# Patient Record
Sex: MALE | Race: WHITE | Employment: OTHER | URBAN - METROPOLITAN AREA
[De-identification: names, ages, dates, MRNs, and addresses within clinical notes are randomized per-mention and may not be internally consistent; named-entity substitution may affect disease eponyms.]

---

## 2019-03-28 ENCOUNTER — CONSULT (OUTPATIENT)
Dept: VASCULAR SURGERY | Facility: CLINIC | Age: 84
End: 2019-03-28
Payer: COMMERCIAL

## 2019-03-28 VITALS
DIASTOLIC BLOOD PRESSURE: 60 MMHG | HEART RATE: 70 BPM | BODY MASS INDEX: 26.14 KG/M2 | HEIGHT: 72 IN | SYSTOLIC BLOOD PRESSURE: 90 MMHG | TEMPERATURE: 98 F | WEIGHT: 193 LBS

## 2019-03-28 DIAGNOSIS — M79.89 SWELLING OF LOWER EXTREMITY: Primary | ICD-10-CM

## 2019-03-28 DIAGNOSIS — Z86.718 HISTORY OF DVT OF LOWER EXTREMITY: ICD-10-CM

## 2019-03-28 DIAGNOSIS — I87.2 CHRONIC VENOUS INSUFFICIENCY: ICD-10-CM

## 2019-03-28 DIAGNOSIS — R09.89 DECREASED PEDAL PULSES: ICD-10-CM

## 2019-03-28 PROBLEM — M19.90 OSTEOARTHRITIS: Status: ACTIVE | Noted: 2019-03-28

## 2019-03-28 PROBLEM — I48.91 A-FIB (HCC): Status: ACTIVE | Noted: 2019-03-28

## 2019-03-28 PROBLEM — I10 HTN (HYPERTENSION): Status: ACTIVE | Noted: 2019-03-28

## 2019-03-28 PROBLEM — E78.5 HLD (HYPERLIPIDEMIA): Status: ACTIVE | Noted: 2019-03-28

## 2019-03-28 PROCEDURE — 99203 OFFICE O/P NEW LOW 30 MIN: CPT | Performed by: NURSE PRACTITIONER

## 2019-03-28 RX ORDER — DILTIAZEM HYDROCHLORIDE 120 MG/1
TABLET, FILM COATED ORAL
COMMUNITY
End: 2019-03-28 | Stop reason: ALTCHOICE

## 2019-03-28 RX ORDER — DABIGATRAN ETEXILATE 150 MG/1
CAPSULE, COATED PELLETS ORAL
COMMUNITY
Start: 2019-01-03

## 2019-03-28 RX ORDER — ESZOPICLONE 2 MG/1
TABLET, FILM COATED ORAL DAILY
COMMUNITY
End: 2019-03-28 | Stop reason: ALTCHOICE

## 2019-03-28 RX ORDER — FUROSEMIDE 20 MG/1
40 TABLET ORAL 2 TIMES DAILY
Refills: 3 | COMMUNITY
Start: 2019-03-22

## 2019-03-28 RX ORDER — PENTOXIFYLLINE 400 MG/1
TABLET, EXTENDED RELEASE ORAL
COMMUNITY
End: 2019-03-28 | Stop reason: ALTCHOICE

## 2019-03-28 RX ORDER — OXYCODONE HYDROCHLORIDE AND ACETAMINOPHEN 5; 325 MG/1; MG/1
TABLET ORAL
COMMUNITY
End: 2019-03-28 | Stop reason: ALTCHOICE

## 2019-03-28 RX ORDER — DOFETILIDE 0.25 MG/1
CAPSULE ORAL
COMMUNITY
Start: 2019-03-27

## 2019-03-28 RX ORDER — DILTIAZEM HYDROCHLORIDE 180 MG/1
CAPSULE, EXTENDED RELEASE ORAL
COMMUNITY
End: 2019-03-28 | Stop reason: ALTCHOICE

## 2019-03-28 RX ORDER — DIPHENOXYLATE HYDROCHLORIDE AND ATROPINE SULFATE 2.5; .025 MG/1; MG/1
1 TABLET ORAL DAILY
COMMUNITY

## 2019-03-28 RX ORDER — WARFARIN SODIUM 5 MG/1
TABLET ORAL
COMMUNITY
End: 2019-03-28 | Stop reason: ALTCHOICE

## 2019-03-28 RX ORDER — LOSARTAN POTASSIUM 25 MG/1
TABLET ORAL
COMMUNITY
Start: 2019-03-22

## 2019-03-28 RX ORDER — DIGOXIN 125 MCG
125 TABLET ORAL EVERY OTHER DAY
Refills: 3 | COMMUNITY
Start: 2019-03-15

## 2019-03-28 RX ORDER — POTASSIUM CHLORIDE 20 MEQ/1
TABLET, EXTENDED RELEASE ORAL
COMMUNITY
Start: 2018-05-29

## 2019-03-28 RX ORDER — CEPHALEXIN 500 MG/1
500 CAPSULE ORAL
COMMUNITY
Start: 2019-03-19 | End: 2019-03-29

## 2019-03-28 RX ORDER — MECLIZINE HCL 12.5 MG/1
TABLET ORAL
Refills: 2 | COMMUNITY
Start: 2019-02-04

## 2019-03-28 NOTE — PROGRESS NOTES
Assessment/Plan:   75-year-old male with HTN, HLD, osteoarthritis, bilateral total knee replacements, left calf vein DVT after knee replacement, AFib on Pradaxa, ICD, chronic venous insufficiency with history of left leg venous ligation who presents to the office for evaluation of bilateral lower extremity and hand swelling  He does have prominent varicosities and evidence of chronic venous insufficiency though notes that bilateral lower extremity discoloration and tenderness started abruptly 3 weeks ago  He had upper and lower extremity venous Dopplers which were negative for acute DVT and did note left subclavian vein chronic occlusion  He was treated for cellulitis for focal lump on left calf which has dissipated    -Not clear this represents a cellulitis though has dissipated   -Clinical exam suggestive of chronic venous insufficiency of the lower extremities  -Etiology of hand swelling and discoloration is unclear  -Recommended compression  Will trial tubi  compression   -Decreased left pedal pulse  Will evaluate with MARIAM to see if he will tolerate a higher grade of compression    -Frequent leg elevation   -Recommended Eucerin moisturizer BID  -Follow up in the office in 3 weeks to reevaluate and review MARIAM  Problem List Items Addressed This Visit        Cardiovascular and Mediastinum    Chronic venous insufficiency       Other    Swelling of lower extremity - Primary    History of DVT of lower extremity    Decreased pedal pulses    Relevant Orders    VAS MARIAM & waveform analysis, multiple levels            Subjective:      Patient ID: Khushi Barrios is a 80 y o  male     Chief Complaint: Patient is new to the practice and presents today for evaluation of bilateral varicose veins  Patient complains of pain, bulging veins and swelling with the left leg being worse than the right  He states that he has had vein stripping about 5-6 years ago in [place] in the left leg   Patient had LEV done recently  HPI  80-year-old male with HTN, HLD, osteoarthritis, bilateral total knee replacements, left calf vein DVT after knee replacement, AFib on Pradaxa, ICD, chronic venous insufficiency with history of left leg venous ligation who presents to the office for vascular evaluation  His daughter brought him to office visit today  Suddenly 2-3 weeks ago developed discoloration to bilateral hands and feet  Discoloration moved up to the left calf with a focal area of tenderness  He was evaluated at [facility] and treated for cellulitis  He had a venous duplex which was negative for DVT  He denies any trauma or incident prior to occurrence  He is long time on anticoagulation for afib, currently on Pradaxa  He saw his PCP and sent for upper extremity venous duplex given his hand swelling and discoloration which was negative for any acute DVT and did note chronic subclavian vein occlusion  He reports history of Raynaud's phenomenon where discoloration was limited to his fingertips though now has completely encompass his hands  The entire hand is discolored and swollen bilaterally  He has skin fissures secondary to dry skin and swelling  No evidence of necrosis  He has easily palpable radial pulses bilaterally with rapid capillary refill  He has prominent veins of the upper and lower extremities as well as the chest wall surrounding his ICD incisional scar  He denies any facial swelling or shortness of breath with lying flat  He does fatigue easily with walking a distance  Also limited by arthritis and shortness of breath  He has also been dealing with orthostasis upon standing  He has chronic venous insufficiency bilateral lower extremities with prior history of vein ligation  He did wear compression in the past though seems to irritate his left knee  Review of Systems   Constitutional: Positive for fatigue  HENT: Negative  Eyes: Positive for itching     Respiratory: Positive for shortness of breath  SOB with activity   Cardiovascular: Positive for leg swelling  Painful veins   Gastrointestinal: Negative  Endocrine: Negative  Genitourinary: Negative  Musculoskeletal: Positive for gait problem and neck pain  Leg pain   Skin: Positive for color change  Allergic/Immunologic: Negative  Neurological: Positive for dizziness and light-headedness  Hematological: Negative  Psychiatric/Behavioral: Positive for sleep disturbance  Objective   Physical Exam   Constitutional: He is oriented to person, place, and time  He appears well-developed and well-nourished  HENT:   Head: Normocephalic and atraumatic  Eyes: EOM are normal    Neck: Neck supple  Cardiovascular:   Murmur heard  Pulses:       Femoral pulses are 2+ on the right side, and 2+ on the left side  Popliteal pulses are 1+ on the right side  Dorsalis pedis pulses are 1+ on the right side, and 0 on the left side  Posterior tibial pulses are 0 on the left side  Pulmonary/Chest: Effort normal and breath sounds normal    Abdominal: Soft  Bowel sounds are normal    Musculoskeletal: Normal range of motion  Trace to 1+ bilateral lower extremity swelling  1+ swelling of bilateral hands    Neurological: He is alert and oriented to person, place, and time  Skin: Skin is warm  Skin is dry with multiple skin fissures of hand and feet    Psychiatric: His behavior is normal    Nursing note and vitals reviewed  The following portions of the patient's history were reviewed and updated as appropriate: allergies, current medications, past family history, past medical history, past social history, past surgical history and problem list  Review of systems reviewed       Review of Systems      Objective:     Vitals:    03/28/19 1327   BP: 90/60   BP Location: Right arm   Patient Position: Sitting   Pulse: 70   Temp: 98 °F (36 7 °C)   TempSrc: Tympanic   Weight: 87 5 kg (193 lb)   Height: 6' (1 829 m)       Patient Active Problem List   Diagnosis    Chronic venous insufficiency    A-fib (HCC)    Swelling of lower extremity    History of DVT of lower extremity    HTN (hypertension)    HLD (hyperlipidemia)    Decreased pedal pulses    Osteoarthritis       Past Surgical History:   Procedure Laterality Date    REPLACEMENT TOTAL KNEE BILATERAL      ROTATOR CUFF REPAIR      VARICOSE VEIN SURGERY         Family History   Family history unknown: Yes       Social History     Socioeconomic History    Marital status: Unknown     Spouse name: Not on file    Number of children: Not on file    Years of education: Not on file    Highest education level: Not on file   Occupational History    Not on file   Social Needs    Financial resource strain: Not on file    Food insecurity:     Worry: Not on file     Inability: Not on file    Transportation needs:     Medical: Not on file     Non-medical: Not on file   Tobacco Use    Smoking status: Former Smoker     Types: Cigarettes    Smokeless tobacco: Never Used   Substance and Sexual Activity    Alcohol use: Not on file    Drug use: Not on file    Sexual activity: Not on file   Lifestyle    Physical activity:     Days per week: Not on file     Minutes per session: Not on file    Stress: Not on file   Relationships    Social connections:     Talks on phone: Not on file     Gets together: Not on file     Attends [employer]ist service: Not on file     Active member of club or organization: Not on file     Attends meetings of clubs or organizations: Not on file     Relationship status: Not on file    Intimate partner violence:     Fear of current or ex partner: Not on file     Emotionally abused: Not on file     Physically abused: Not on file     Forced sexual activity: Not on file   Other Topics Concern    Not on file   Social History Narrative    Not on file       Allergies   Allergen Reactions    Carvedilol Itching and Other (See Comments) rash           Current Outpatient Medications:     ASPIRIN 81 PO, aspirin 81 mg tablet,delayed release  TAKE 1 TABLET DAILY, Disp: , Rfl:     cephalexin (KEFLEX) 500 mg capsule, Take 500 mg by mouth, Disp: , Rfl:     dabigatran etexilate (PRADAXA) 150 mg capsu, Pradaxa 150 mg capsule, Disp: , Rfl:     digoxin (LANOXIN) 0 125 mg tablet, Take 125 mcg by mouth every other day, Disp: , Rfl: 3    dofetilide (TIKOSYN) 250 mcg capsule, dofetilide 250 mcg capsule, Disp: , Rfl:     furosemide (LASIX) 20 mg tablet, 40 mg 2 (two) times a day, Disp: , Rfl: 3    losartan (COZAAR) 25 mg tablet, losartan 25 mg tablet, Disp: , Rfl:     meclizine (ANTIVERT) 12 5 MG tablet, TAKE 1/2 TO 1 TABLET 3 TIMES A DAY AS NEEDED FOR DIZZINESS, Disp: , Rfl: 2    multivitamin (THERAGRAN) TABS, Take 1 tablet by mouth daily, Disp: , Rfl:     potassium chloride (KLOR-CON M20) 20 mEq tablet, Klor-Con M20 mEq tablet,extended release, Disp: , Rfl:       Vitals:    03/28/19 1327   BP: 90/60   Pulse: 70   Temp: 98 °F (36 7 °C)        Imaging Tab: LEV 3/19/19 @ OSH was negative for DVT, + bakers cyst; UEV 3/25 - negative for acute DVT w/ evidence of chronic occlusion L subclavian vein     Physical Exam

## 2019-03-28 NOTE — PATIENT INSTRUCTIONS
Single layer light Tubigrip compression to help manage swelling  Decreased pedal pulse on the left side  Will evaluate with MARIAM to be sure arterial perfusion is adequate enough to tolerate higher level of compression  Periodic leg elevation  Follow-up in the office in 2-3 weeks to re-evaluate swelling review arterial study

## 2019-03-28 NOTE — LETTER
March 28, 2019     [facility]  [address] [id number]    Patient: Elizabeth Felix   YOB: 1934   Date of Visit: 3/28/2019       Dear Dr Garth Chadwick:    Thank you for referring Elizabeth Felix to me for evaluation  Below are my notes for this consultation  If you have questions, please do not hesitate to call me  I look forward to following your patient along with you  Sincerely,        MATT Mckeon        CC: MD Toni Dacosta CRNP  3/28/2019  3:16 PM  Sign at close encounter  Assessment/Plan:   70-year-old male with HTN, HLD, osteoarthritis, bilateral total knee replacements, left calf vein DVT after knee replacement, AFib on Pradaxa, ICD, chronic venous insufficiency with history of left leg venous ligation who presents to the office for evaluation of bilateral lower extremity and hand swelling  He does have prominent varicosities and evidence of chronic venous insufficiency though notes that bilateral lower extremity discoloration and tenderness started abruptly 3 weeks ago  He had upper and lower extremity venous Dopplers which were negative for acute DVT and did note left subclavian vein chronic occlusion  He was treated for cellulitis for focal lump on left calf which has dissipated    -Not clear this represents a cellulitis though has dissipated   -Clinical exam suggestive of chronic venous insufficiency of the lower extremities  -Etiology of hand swelling and discoloration is unclear  -Recommended compression  Will trial tubi  compression   -Decreased left pedal pulse  Will evaluate with MARIAM to see if he will tolerate a higher grade of compression    -Frequent leg elevation   -Recommended Eucerin moisturizer BID  -Follow up in the office in 3 weeks to reevaluate and review MARIAM              Problem List Items Addressed This Visit        Cardiovascular and Mediastinum    Chronic venous insufficiency       Other Swelling of lower extremity - Primary    History of DVT of lower extremity    Decreased pedal pulses    Relevant Orders    VAS MARIAM & waveform analysis, multiple levels            Subjective:      Patient ID: Sheba Wyatt is a 80 y o  male     Chief Complaint: Patient is new to the practice and presents today for evaluation of bilateral varicose veins  Patient complains of pain, bulging veins and swelling with the left leg being worse than the right  He states that he has had vein stripping about 5-6 years ago in [facility] in the left leg  Patient had LEV done recently  HPI  80-year-old male with HTN, HLD, osteoarthritis, bilateral total knee replacements, left calf vein DVT after knee replacement, AFib on Pradaxa, ICD, chronic venous insufficiency with history of left leg venous ligation who presents to the office for vascular evaluation  His daughter brought him to office visit today  Suddenly 2-3 weeks ago developed discoloration to bilateral hands and feet  Discoloration moved up to the left calf with a focal area of tenderness  He was evaluated at [facility] and treated for cellulitis  He had a venous duplex which was negative for DVT  He denies any trauma or incident prior to occurrence  He is long time on anticoagulation for afib, currently on Pradaxa  He saw his PCP and sent for upper extremity venous duplex given his hand swelling and discoloration which was negative for any acute DVT and did note chronic subclavian vein occlusion  He reports history of Raynaud's phenomenon where discoloration was limited to his fingertips though now has completely encompass his hands  The entire hand is discolored and swollen bilaterally  He has skin fissures secondary to dry skin and swelling  No evidence of necrosis  He has easily palpable radial pulses bilaterally with rapid capillary refill    He has prominent veins of the upper and lower extremities as well as the chest wall surrounding his ICD incisional scar   He denies any facial swelling or shortness of breath with lying flat  He does fatigue easily with walking a distance  Also limited by arthritis and shortness of breath  He has also been dealing with orthostasis upon standing  He has chronic venous insufficiency bilateral lower extremities with prior history of vein ligation  He did wear compression in the past though seems to irritate his left knee  Review of Systems   Constitutional: Positive for fatigue  HENT: Negative  Eyes: Positive for itching  Respiratory: Positive for shortness of breath  SOB with activity   Cardiovascular: Positive for leg swelling  Painful veins   Gastrointestinal: Negative  Endocrine: Negative  Genitourinary: Negative  Musculoskeletal: Positive for gait problem and neck pain  Leg pain   Skin: Positive for color change  Allergic/Immunologic: Negative  Neurological: Positive for dizziness and light-headedness  Hematological: Negative  Psychiatric/Behavioral: Positive for sleep disturbance  Objective   Physical Exam   Constitutional: He is oriented to person, place, and time  He appears well-developed and well-nourished  HENT:   Head: Normocephalic and atraumatic  Eyes: EOM are normal    Neck: Neck supple  Cardiovascular:   Murmur heard  Pulses:       Femoral pulses are 2+ on the right side, and 2+ on the left side  Popliteal pulses are 1+ on the right side  Dorsalis pedis pulses are 1+ on the right side, and 0 on the left side  Posterior tibial pulses are 0 on the left side  Pulmonary/Chest: Effort normal and breath sounds normal    Abdominal: Soft  Bowel sounds are normal    Musculoskeletal: Normal range of motion  Trace to 1+ bilateral lower extremity swelling  1+ swelling of bilateral hands    Neurological: He is alert and oriented to person, place, and time  Skin: Skin is warm     Skin is dry with multiple skin fissures of hand and feet    Psychiatric: His behavior is normal    Nursing note and vitals reviewed  The following portions of the patient's history were reviewed and updated as appropriate: allergies, current medications, past family history, past medical history, past social history, past surgical history and problem list  Review of systems reviewed       Review of Systems      Objective:     Vitals:    03/28/19 1327   BP: 90/60   BP Location: Right arm   Patient Position: Sitting   Pulse: 70   Temp: 98 °F (36 7 °C)   TempSrc: Tympanic   Weight: 87 5 kg (193 lb)   Height: 6' (1 829 m)       Patient Active Problem List   Diagnosis    Chronic venous insufficiency    A-fib (HCC)    Swelling of lower extremity    History of DVT of lower extremity    HTN (hypertension)    HLD (hyperlipidemia)    Decreased pedal pulses    Osteoarthritis       Past Surgical History:   Procedure Laterality Date    REPLACEMENT TOTAL KNEE BILATERAL      ROTATOR CUFF REPAIR      VARICOSE VEIN SURGERY         Family History   Family history unknown: Yes       Social History     Socioeconomic History    Marital status: Unknown     Spouse name: Not on file    Number of children: Not on file    Years of education: Not on file    Highest education level: Not on file   Occupational History    Not on file   Social Needs    Financial resource strain: Not on file    Food insecurity:     Worry: Not on file     Inability: Not on file    Transportation needs:     Medical: Not on file     Non-medical: Not on file   Tobacco Use    Smoking status: Former Smoker     Types: Cigarettes    Smokeless tobacco: Never Used   Substance and Sexual Activity    Alcohol use: Not on file    Drug use: Not on file    Sexual activity: Not on file   Lifestyle    Physical activity:     Days per week: Not on file     Minutes per session: Not on file    Stress: Not on file   Relationships    Social connections:     Talks on phone: Not on file     Gets together: Not on file     Attends Gnosticist service: Not on file     Active member of club or organization: Not on file     Attends meetings of clubs or organizations: Not on file     Relationship status: Not on file    Intimate partner violence:     Fear of current or ex partner: Not on file     Emotionally abused: Not on file     Physically abused: Not on file     Forced sexual activity: Not on file   Other Topics Concern    Not on file   Social History Narrative    Not on file       Allergies   Allergen Reactions    Carvedilol Itching and Other (See Comments)     rash           Current Outpatient Medications:     ASPIRIN 81 PO, aspirin 81 mg tablet,delayed release  TAKE 1 TABLET DAILY, Disp: , Rfl:     cephalexin (KEFLEX) 500 mg capsule, Take 500 mg by mouth, Disp: , Rfl:     dabigatran etexilate (PRADAXA) 150 mg capsu, Pradaxa 150 mg capsule, Disp: , Rfl:     digoxin (LANOXIN) 0 125 mg tablet, Take 125 mcg by mouth every other day, Disp: , Rfl: 3    dofetilide (TIKOSYN) 250 mcg capsule, dofetilide 250 mcg capsule, Disp: , Rfl:     furosemide (LASIX) 20 mg tablet, 40 mg 2 (two) times a day, Disp: , Rfl: 3    losartan (COZAAR) 25 mg tablet, losartan 25 mg tablet, Disp: , Rfl:     meclizine (ANTIVERT) 12 5 MG tablet, TAKE 1/2 TO 1 TABLET 3 TIMES A DAY AS NEEDED FOR DIZZINESS, Disp: , Rfl: 2    multivitamin (THERAGRAN) TABS, Take 1 tablet by mouth daily, Disp: , Rfl:     potassium chloride (KLOR-CON M20) 20 mEq tablet, Klor-Con M20 mEq tablet,extended release, Disp: , Rfl:       Vitals:    03/28/19 1327   BP: 90/60   Pulse: 70   Temp: 98 °F (36 7 °C)        Imaging Tab: LEV 3/19/19 @ OSH was negative for DVT, + bakers cyst; UEV 3/25 - negative for acute DVT w/ evidence of chronic occlusion L subclavian vein     Physical Exam

## 2019-04-04 ENCOUNTER — HOSPITAL ENCOUNTER (OUTPATIENT)
Dept: NON INVASIVE DIAGNOSTICS | Facility: CLINIC | Age: 84
Discharge: HOME | End: 2019-04-04
Payer: COMMERCIAL

## 2019-04-04 DIAGNOSIS — R09.89 DECREASED PEDAL PULSES: ICD-10-CM

## 2019-04-04 PROCEDURE — 93925 LOWER EXTREMITY STUDY: CPT | Performed by: SURGERY

## 2019-04-04 PROCEDURE — 93922 UPR/L XTREMITY ART 2 LEVELS: CPT | Performed by: SURGERY

## 2019-04-04 PROCEDURE — 93923 UPR/LXTR ART STDY 3+ LVLS: CPT

## 2019-04-04 PROCEDURE — 93925 LOWER EXTREMITY STUDY: CPT

## 2019-04-05 ENCOUNTER — TELEPHONE (OUTPATIENT)
Dept: VASCULAR SURGERY | Facility: CLINIC | Age: 84
End: 2019-04-05

## 2019-04-22 ENCOUNTER — OFFICE VISIT (OUTPATIENT)
Dept: VASCULAR SURGERY | Facility: CLINIC | Age: 84
End: 2019-04-22
Payer: COMMERCIAL

## 2019-04-22 VITALS
HEIGHT: 72 IN | WEIGHT: 198 LBS | TEMPERATURE: 95.6 F | DIASTOLIC BLOOD PRESSURE: 60 MMHG | SYSTOLIC BLOOD PRESSURE: 118 MMHG | BODY MASS INDEX: 26.82 KG/M2 | HEART RATE: 72 BPM

## 2019-04-22 DIAGNOSIS — Z86.718 HISTORY OF DVT OF LOWER EXTREMITY: ICD-10-CM

## 2019-04-22 DIAGNOSIS — I87.2 CHRONIC VENOUS INSUFFICIENCY: ICD-10-CM

## 2019-04-22 DIAGNOSIS — I73.9 PERIPHERAL ARTERIAL DISEASE (HCC): Primary | ICD-10-CM

## 2019-04-22 PROCEDURE — 99213 OFFICE O/P EST LOW 20 MIN: CPT | Performed by: NURSE PRACTITIONER
